# Patient Record
Sex: FEMALE
[De-identification: names, ages, dates, MRNs, and addresses within clinical notes are randomized per-mention and may not be internally consistent; named-entity substitution may affect disease eponyms.]

---

## 2023-06-20 ENCOUNTER — RX ONLY (OUTPATIENT)
Age: 23
Setting detail: RX ONLY
End: 2023-06-20

## 2023-08-09 ENCOUNTER — APPOINTMENT (RX ONLY)
Dept: URBAN - METROPOLITAN AREA CLINIC 93 | Facility: CLINIC | Age: 23
Setting detail: DERMATOLOGY
End: 2023-08-09

## 2023-08-09 DIAGNOSIS — L259 CONTACT DERMATITIS AND OTHER ECZEMA, UNSPECIFIED CAUSE: ICD-10-CM

## 2023-08-09 PROBLEM — L23.9 ALLERGIC CONTACT DERMATITIS, UNSPECIFIED CAUSE: Status: ACTIVE | Noted: 2023-08-09

## 2023-08-09 PROCEDURE — ? TREATMENT REGIMEN

## 2023-08-09 PROCEDURE — ? COUNSELING

## 2023-08-09 PROCEDURE — ? REFERRAL

## 2023-08-09 PROCEDURE — 99204 OFFICE O/P NEW MOD 45 MIN: CPT

## 2023-08-09 PROCEDURE — ? PRESCRIPTION

## 2023-08-09 RX ORDER — CLOBETASOL PROPIONATE 0.5 MG/G
OINTMENT TOPICAL
Qty: 60 | Refills: 2 | Status: ERX | COMMUNITY
Start: 2023-08-09

## 2023-08-09 RX ADMIN — CLOBETASOL PROPIONATE: 0.5 OINTMENT TOPICAL at 00:00

## 2023-08-09 ASSESSMENT — LOCATION SIMPLE DESCRIPTION DERM
LOCATION SIMPLE: RIGHT FOOT
LOCATION SIMPLE: LEFT HAND
LOCATION SIMPLE: RIGHT HAND
LOCATION SIMPLE: LEFT KNEE
LOCATION SIMPLE: LEFT FOOT
LOCATION SIMPLE: RIGHT KNEE

## 2023-08-09 ASSESSMENT — LOCATION ZONE DERM
LOCATION ZONE: LEG
LOCATION ZONE: FEET
LOCATION ZONE: HAND

## 2023-08-09 ASSESSMENT — LOCATION DETAILED DESCRIPTION DERM
LOCATION DETAILED: RIGHT DORSAL FOOT
LOCATION DETAILED: LEFT DORSAL FOOT
LOCATION DETAILED: RIGHT KNEE
LOCATION DETAILED: RIGHT RADIAL DORSAL HAND
LOCATION DETAILED: LEFT ULNAR DORSAL HAND
LOCATION DETAILED: LEFT KNEE

## 2023-08-09 NOTE — PROCEDURE: TREATMENT REGIMEN
Detail Level: Zone
Modify Regimen: Soak and seal: soak hands and feet in water for 1-2 mins, rinse thoroughly, apply steroid and cover with gloves/socks.
Initiate Treatment: clobetasol 0.05 % topical ointment: Apply a twice daily affected areas for max 2 wks or until rash resolves

## 2023-08-09 NOTE — HPI: RASH
Is This A New Presentation, Or A Follow-Up?: Rash
Additional History: Patient states rash occurs on and off for 7 months.

## 2023-09-07 ENCOUNTER — APPOINTMENT (RX ONLY)
Dept: URBAN - METROPOLITAN AREA CLINIC 99 | Facility: CLINIC | Age: 23
Setting detail: DERMATOLOGY
End: 2023-09-07

## 2023-09-07 DIAGNOSIS — L29.8 OTHER PRURITUS: ICD-10-CM

## 2023-09-07 DIAGNOSIS — L23.9 ALLERGIC CONTACT DERMATITIS, UNSPECIFIED CAUSE: ICD-10-CM

## 2023-09-07 DIAGNOSIS — L29.89 OTHER PRURITUS: ICD-10-CM

## 2023-09-07 PROCEDURE — 95044 PATCH/APPLICATION TESTS: CPT

## 2023-09-07 PROCEDURE — 99213 OFFICE O/P EST LOW 20 MIN: CPT | Mod: 25

## 2023-09-07 PROCEDURE — ? ADDITIONAL NOTES

## 2023-09-07 PROCEDURE — ? COUNSELING

## 2023-09-07 ASSESSMENT — LOCATION SIMPLE DESCRIPTION DERM
LOCATION SIMPLE: LEFT FOOT
LOCATION SIMPLE: RIGHT KNEE
LOCATION SIMPLE: RIGHT FOOT
LOCATION SIMPLE: LEFT KNEE
LOCATION SIMPLE: RIGHT HAND
LOCATION SIMPLE: LEFT HAND

## 2023-09-07 ASSESSMENT — LOCATION DETAILED DESCRIPTION DERM
LOCATION DETAILED: RIGHT RADIAL DORSAL HAND
LOCATION DETAILED: RIGHT DORSAL FOOT
LOCATION DETAILED: LEFT KNEE
LOCATION DETAILED: LEFT RADIAL DORSAL HAND
LOCATION DETAILED: LEFT DORSAL FOOT
LOCATION DETAILED: RIGHT KNEE

## 2023-09-07 ASSESSMENT — LOCATION ZONE DERM
LOCATION ZONE: HAND
LOCATION ZONE: FEET
LOCATION ZONE: LEG

## 2023-09-07 NOTE — PROCEDURE: ADDITIONAL NOTES
Render Risk Assessment In Note?: no
Additional Notes: Patch testing indicated to r/o ACD due to refractory & localized nature of dermatitis\\nRBSE of patch testing reviewed, including but not limited to rash, itching, allergic reaction, systemic rash, very rare possibility of anaphylaxis to allergen. I reviewed with the patient in detail post-care instructions. \\nPatient should not sweat, pick at, or get the patches wet for 48 hours. Patches will be removed at home in 48 hours and patient will RTC on Monday for final patch test reading. \\nThe following patches were placed today:\\nNAS (1-9)\\nFragrances (10-15)\\nAcrylates (16-18)\\nShoe (19-21)\\nPersonals (22)\\nTotal number of allergens applied: 196
Detail Level: Simple
Additional Notes: In the setting of chronic dermatitis and patch testing \\n- ok to continue use of topicals to any affected areas w/ exception of back and arms, where patches will be applied\\n- ok to take antihistamines as needed for pruritus before and during patch testing

## 2023-09-11 ENCOUNTER — APPOINTMENT (RX ONLY)
Dept: URBAN - METROPOLITAN AREA CLINIC 99 | Facility: CLINIC | Age: 23
Setting detail: DERMATOLOGY
End: 2023-09-11

## 2023-09-11 DIAGNOSIS — L259 CONTACT DERMATITIS AND OTHER ECZEMA, UNSPECIFIED CAUSE: ICD-10-CM

## 2023-09-11 PROBLEM — L23.9 ALLERGIC CONTACT DERMATITIS, UNSPECIFIED CAUSE: Status: ACTIVE | Noted: 2023-09-11

## 2023-09-11 PROCEDURE — ? PHOTO-DOCUMENTATION

## 2023-09-11 PROCEDURE — ? COUNSELING

## 2023-09-11 PROCEDURE — ? ADDITIONAL NOTES

## 2023-09-11 PROCEDURE — 99214 OFFICE O/P EST MOD 30 MIN: CPT

## 2023-09-11 ASSESSMENT — LOCATION DETAILED DESCRIPTION DERM
LOCATION DETAILED: RIGHT DORSAL FOOT
LOCATION DETAILED: LEFT KNEE
LOCATION DETAILED: RIGHT RADIAL DORSAL HAND
LOCATION DETAILED: LEFT DORSAL FOOT
LOCATION DETAILED: LEFT ULNAR DORSAL HAND
LOCATION DETAILED: RIGHT KNEE

## 2023-09-11 ASSESSMENT — LOCATION ZONE DERM
LOCATION ZONE: FEET
LOCATION ZONE: LEG
LOCATION ZONE: HAND

## 2023-09-11 ASSESSMENT — LOCATION SIMPLE DESCRIPTION DERM
LOCATION SIMPLE: LEFT HAND
LOCATION SIMPLE: RIGHT KNEE
LOCATION SIMPLE: LEFT KNEE
LOCATION SIMPLE: RIGHT FOOT
LOCATION SIMPLE: RIGHT HAND
LOCATION SIMPLE: LEFT FOOT

## 2023-09-11 NOTE — PROCEDURE: ADDITIONAL NOTES
Render Risk Assessment In Note?: no
Additional Notes: Plan:\\n- detailed information provided on allergens\\n- personalized skinSAFE account created to help patient identify allergen-free products\\n- continue topicals BID to any positive patch areas and areas of residual dermatitis, RBSE\\n- RTC in 2m to assess response to allergen avoidance
Detail Level: Simple
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 Uses sunflower oil regularly on skin. \\n\\n3) HP linalool/limonene: irritant reactions to these allergens are common but given crescendo pattern of reaction and 2+ to 0.5%, I think these may be real reactions. Most of Lori PP's have fragrance or essential oils ([employer]). \\n\\n4) Cocamide KATIUSKA - we discussed sources and possible relevance given that she had a borderline reactions. Avoiding other coconut-based surfactants or ingredients is probably not necessary given low risk of cross-reaction.

## 2023-09-11 NOTE — HPI: TESTING (PATCH TESTING READING, DISCUSSION OF RESULTS)
How Severe Is Your Rash?: mild
What Patch Testing Have You Undergone?: other
What Reading Is This?: 96 hour patch test reading